# Patient Record
Sex: MALE | Race: BLACK OR AFRICAN AMERICAN | NOT HISPANIC OR LATINO | URBAN - METROPOLITAN AREA
[De-identification: names, ages, dates, MRNs, and addresses within clinical notes are randomized per-mention and may not be internally consistent; named-entity substitution may affect disease eponyms.]

---

## 2018-10-22 ENCOUNTER — OFFICE VISIT - HEALTHEAST (OUTPATIENT)
Dept: FAMILY MEDICINE | Facility: CLINIC | Age: 23
End: 2018-10-22

## 2018-10-22 DIAGNOSIS — F20.0 PARANOID SCHIZOPHRENIA, CHRONIC CONDITION (H): ICD-10-CM

## 2018-10-22 DIAGNOSIS — Z01.00 ENCOUNTER FOR EXAMINATION OF VISION: ICD-10-CM

## 2018-10-22 DIAGNOSIS — F31.63: ICD-10-CM

## 2018-10-22 DIAGNOSIS — F17.200 TOBACCO USE DISORDER: ICD-10-CM

## 2018-10-22 DIAGNOSIS — R45.851 SUICIDAL IDEATIONS: ICD-10-CM

## 2018-10-22 ASSESSMENT — MIFFLIN-ST. JEOR: SCORE: 2182.38

## 2018-11-15 ENCOUNTER — COMMUNICATION - HEALTHEAST (OUTPATIENT)
Dept: SCHEDULING | Facility: CLINIC | Age: 23
End: 2018-11-15

## 2018-12-04 ENCOUNTER — COMMUNICATION - HEALTHEAST (OUTPATIENT)
Dept: FAMILY MEDICINE | Facility: CLINIC | Age: 23
End: 2018-12-04

## 2018-12-04 DIAGNOSIS — F31.63: ICD-10-CM

## 2018-12-06 ENCOUNTER — COMMUNICATION - HEALTHEAST (OUTPATIENT)
Dept: ADMINISTRATIVE | Facility: CLINIC | Age: 23
End: 2018-12-06

## 2018-12-10 ENCOUNTER — COMMUNICATION - HEALTHEAST (OUTPATIENT)
Dept: FAMILY MEDICINE | Facility: CLINIC | Age: 23
End: 2018-12-10

## 2019-01-22 ENCOUNTER — OFFICE VISIT - HEALTHEAST (OUTPATIENT)
Dept: FAMILY MEDICINE | Facility: CLINIC | Age: 24
End: 2019-01-22

## 2019-01-22 ENCOUNTER — COMMUNICATION - HEALTHEAST (OUTPATIENT)
Dept: FAMILY MEDICINE | Facility: CLINIC | Age: 24
End: 2019-01-22

## 2019-01-22 ENCOUNTER — RECORDS - HEALTHEAST (OUTPATIENT)
Dept: GENERAL RADIOLOGY | Facility: CLINIC | Age: 24
End: 2019-01-22

## 2019-01-22 DIAGNOSIS — J03.90 TONSILLITIS: ICD-10-CM

## 2019-01-22 DIAGNOSIS — R05.9 COUGH: ICD-10-CM

## 2019-01-22 DIAGNOSIS — L03.221 CELLULITIS OF NECK: ICD-10-CM

## 2019-01-22 DIAGNOSIS — R07.0 THROAT PAIN: ICD-10-CM

## 2019-01-22 LAB
BASOPHILS # BLD AUTO: 0.1 THOU/UL (ref 0–0.2)
BASOPHILS NFR BLD AUTO: 1 % (ref 0–2)
DEPRECATED S PYO AG THROAT QL EIA: NORMAL
EOSINOPHIL # BLD AUTO: 0.2 THOU/UL (ref 0–0.4)
EOSINOPHIL NFR BLD AUTO: 1 % (ref 0–6)
ERYTHROCYTE [DISTWIDTH] IN BLOOD BY AUTOMATED COUNT: 12.9 % (ref 11–14.5)
HCT VFR BLD AUTO: 43.6 % (ref 40–54)
HGB BLD-MCNC: 14.6 G/DL (ref 14–18)
LYMPHOCYTES # BLD AUTO: 2.4 THOU/UL (ref 0.8–4.4)
LYMPHOCYTES NFR BLD AUTO: 20 % (ref 20–40)
MCH RBC QN AUTO: 29 PG (ref 27–34)
MCHC RBC AUTO-ENTMCNC: 33.4 G/DL (ref 32–36)
MCV RBC AUTO: 87 FL (ref 80–100)
MONOCYTES # BLD AUTO: 1.6 THOU/UL (ref 0–0.9)
MONOCYTES NFR BLD AUTO: 13 % (ref 2–10)
NEUTROPHILS # BLD AUTO: 7.9 THOU/UL (ref 2–7.7)
NEUTROPHILS NFR BLD AUTO: 65 % (ref 50–70)
PLATELET # BLD AUTO: 190 THOU/UL (ref 140–440)
PMV BLD AUTO: 7.8 FL (ref 7–10)
RBC # BLD AUTO: 5.02 MILL/UL (ref 4.4–6.2)
WBC: 12.2 THOU/UL (ref 4–11)

## 2019-01-23 LAB — GROUP A STREP BY PCR: NORMAL

## 2019-01-28 ENCOUNTER — COMMUNICATION - HEALTHEAST (OUTPATIENT)
Dept: FAMILY MEDICINE | Facility: CLINIC | Age: 24
End: 2019-01-28

## 2019-11-24 ENCOUNTER — HOSPITAL ENCOUNTER (EMERGENCY)
Facility: CLINIC | Age: 24
Discharge: HOME | End: 2019-11-24
Attending: FAMILY MEDICINE | Admitting: FAMILY MEDICINE
Payer: MEDICARE

## 2019-11-24 VITALS
TEMPERATURE: 96.1 F | BODY MASS INDEX: 42.95 KG/M2 | DIASTOLIC BLOOD PRESSURE: 73 MMHG | RESPIRATION RATE: 16 BRPM | SYSTOLIC BLOOD PRESSURE: 118 MMHG | HEART RATE: 94 BPM | OXYGEN SATURATION: 97 % | WEIGHT: 290 LBS | HEIGHT: 69 IN

## 2019-11-24 DIAGNOSIS — F25.0 SCHIZOAFFECTIVE DISORDER, BIPOLAR TYPE (H): ICD-10-CM

## 2019-11-24 LAB
ALCOHOL BREATH TEST: 0 (ref 0–0.01)
AMPHETAMINES UR QL SCN: NEGATIVE
BARBITURATES UR QL: NEGATIVE
BENZODIAZ UR QL: NEGATIVE
CANNABINOIDS UR QL SCN: NEGATIVE
COCAINE UR QL: NEGATIVE
ETHANOL UR QL SCN: NEGATIVE
OPIATES UR QL SCN: NEGATIVE

## 2019-11-24 PROCEDURE — 80307 DRUG TEST PRSMV CHEM ANLYZR: CPT | Performed by: FAMILY MEDICINE

## 2019-11-24 PROCEDURE — 80320 DRUG SCREEN QUANTALCOHOLS: CPT | Performed by: FAMILY MEDICINE

## 2019-11-24 PROCEDURE — 90791 PSYCH DIAGNOSTIC EVALUATION: CPT

## 2019-11-24 PROCEDURE — 99283 EMERGENCY DEPT VISIT LOW MDM: CPT | Mod: Z6 | Performed by: FAMILY MEDICINE

## 2019-11-24 PROCEDURE — 99285 EMERGENCY DEPT VISIT HI MDM: CPT | Mod: 25 | Performed by: FAMILY MEDICINE

## 2019-11-24 RX ORDER — DIVALPROEX SODIUM 125 MG/1
125 TABLET, DELAYED RELEASE ORAL 2 TIMES DAILY
COMMUNITY

## 2019-11-24 RX ORDER — NICOTINE 21 MG/24HR
1 PATCH, TRANSDERMAL 24 HOURS TRANSDERMAL ONCE
Status: DISCONTINUED | OUTPATIENT
Start: 2019-11-24 | End: 2019-11-24

## 2019-11-24 RX ORDER — OLANZAPINE 10 MG/1
10 TABLET ORAL DAILY
COMMUNITY

## 2019-11-24 ASSESSMENT — MIFFLIN-ST. JEOR: SCORE: 2277.87

## 2019-11-24 NOTE — ED AVS SNAPSHOT
[facility], Emergency Department  [address]  [facility] [address]  Phone:  [phone]  Fax:  [phone]                                    Stefano Lopez   MRN: [id number]    Department:  [facility], Emergency Department   Date of Visit:  11/24/2019           After Visit Summary Signature Page    I have received my discharge instructions, and my questions have been answered. I have discussed any challenges I see with this plan with the nurse or doctor.    ..........................................................................................................................................  Patient/Patient Representative Signature      ..........................................................................................................................................  Patient Representative Print Name and Relationship to Patient    ..................................................               ................................................  Date                                   Time    ..........................................................................................................................................  Reviewed by Signature/Title    ...................................................              ..............................................  Date                                               Time          22EPIC Rev 08/18

## 2019-11-25 NOTE — ED PROVIDER NOTES
"  History     Chief Complaint   Patient presents with     Suicidal     Third party called Police saying patient walked up to them saying he was going to kill himself. Plan of using gun. Police have seen patient before, Hx of physical aggression with Police and EMS.      HPI  Stefano Lopez is a 24 year old male who has a history of schizoaffective disorder bipolar type, TBI with cognitive impairment, resident of a group home.  Recently group home has been giving him more freedom to do his own activities.  Today he went to his mother's house, walking there.  Was unable to get in and so he got angry.  He went to a nearby house and told the residents there that he was suicidal with a plan to shoot himself and asked them to call the police.  He now states he was just angry.  He denies that he is suicidal at this time.  He admits he has no access to firearms anyway.  He states he would not want to kill himself because it would hurt.  He denies any physical complaints.  He denies any substance abuse.    I have reviewed the Medications, Allergies, Past Medical and Surgical History, and Social History in the Epic system.    Review of Systems  All other systems were reviewed and are negative    Physical Exam   BP: (!) 153/88  Heart Rate: 108  Temp: 98.5  F (36.9  C)  Resp: 16  Height: 174 cm (5' 8.5\")  Weight: 131.5 kg (290 lb)  SpO2: 97 %      Physical Exam  Constitutional:       General: He is not in acute distress.     Appearance: He is not diaphoretic.   HENT:      Head: Atraumatic.   Eyes:      General: No scleral icterus.     Pupils: Pupils are equal, round, and reactive to light.   Cardiovascular:      Heart sounds: Normal heart sounds.   Pulmonary:      Effort: No respiratory distress.      Breath sounds: Normal breath sounds.   Abdominal:      General: Bowel sounds are normal.      Palpations: Abdomen is soft.      Tenderness: There is no abdominal tenderness.   Musculoskeletal:         General: No tenderness. "   Skin:     General: Skin is warm.      Findings: No rash.   Psychiatric:         Attention and Perception: Attention normal.         Mood and Affect: Mood normal.         Speech: Speech normal.         Behavior: Behavior normal.         Thought Content: Thought content is not paranoid. Thought content does not include homicidal or suicidal ideation.         ED Course        Procedures             Critical Care time:  none             Labs Ordered and Resulted from Time of ED Arrival Up to the Time of Departure from the ED   ALCOHOL BREATH TEST POCT - Normal   DRUG ABUSE SCREEN 6 CHEM DEP URINE ([facility])            Assessments & Plan (with Medical Decision Making)   24-year-old male, resident of a group home with chronic mental illness.  Today he got angry when he could not get into his mother's house and told a bystander that he was feeling suicidal.  The patient was also seen by the [facility] , please refer to their extensive note/evaluation which was reviewed with me and is documented in EPIC on 11/24/2019 for further details.  The patient is now pleasant, cooperative and does not appear depressed.  He states he was angry before but no longer feels suicidal.  He does not have any active psychiatric symptoms while in the ED.  No medical complaints.  Patient does not appear to meet criteria for inpatient hospitalization, and is unlikely to benefit from hospitalization at this time based on the clinical presentation.  We spoke with the group home who will accept him back in transfer.  This appears to be an exacerbation of his chronic mental illness and he does not appear to represent an imminent risk of harm to self or others.  We discussed the indications for emergency department return and follow-up.  Stable for discharge.    I have reviewed the nursing notes.    I have reviewed the findings, diagnosis, plan and need for follow up with the patient.    New Prescriptions    No medications on file       Final  diagnoses:   Schizoaffective disorder, bipolar type (H)       11/24/2019   [facility], [place], EMERGENCY DEPARTMENT     Hector Espinosa MD  11/24/19 2697

## 2019-11-25 NOTE — ED NOTES
Patient reports SI with plan of gun to the head. Patient does not have access to a gun. Patient reports previous attempt several years ago after using cocaine for the first time, was going to jump off a bridge, but thought about how his bones would be broken, did not want to suffer and thought of Mother and Father. Stressors include lives in group house where he feels really isolated, no one wants to talk to him. Patient went to his Mother's house today. His Mother would not let him in so patient went to a neighbor across the street and told them he was SI. Patient reports address as [address] [place] [address] in [place]. Hx of TBI age 14 when boxing. Takes Depakote twice daily, first dose was taken today. Takes Zyprexa in the evenings, did not take yet. Is able to contract for safety. Patient has an Hx of aggression to Police and EMS, considering this Hx assessment to be done in main ED. Patient is calm and cooperative at this time.

## 2019-11-25 NOTE — ED NOTES
Bed: ED16A  Expected date:   Expected time:   Means of arrival:   Comments:  , 22M, psych, cooperative, yellow in 10.

## 2019-11-25 NOTE — DISCHARGE INSTRUCTIONS
Thank you for choosing [facility].     Please closely monitor for further symptoms. Return to the Emergency Department if you develop any new or worsening signs or symptoms.    If you received any opiate pain medications or sedatives during your visit, please do not drive for at least 8 hours.     Labs, cultures or final xray interpretations may still need to be reviewed.  We will call you if your plan of care needs to be changed.    Please follow up with your primary care physician or clinic.

## 2019-11-25 NOTE — ED TRIAGE NOTES
Third party called Police saying patient walked up to them saying he was going to kill himself. Plan of using gun. Police have seen patient before, Hx of physical aggression with Police and EMS.

## 2021-06-02 VITALS — WEIGHT: 284.4 LBS | HEIGHT: 64 IN | BODY MASS INDEX: 48.55 KG/M2

## 2021-06-02 VITALS — WEIGHT: 306 LBS | BODY MASS INDEX: 51.87 KG/M2

## 2021-06-16 PROBLEM — F29 PSYCHOSIS (H): Status: ACTIVE | Noted: 2017-07-28

## 2021-06-16 PROBLEM — R45.851 SUICIDAL IDEATIONS: Status: ACTIVE | Noted: 2017-07-28

## 2021-06-16 PROBLEM — F12.10 CANNABIS ABUSE: Chronic | Status: ACTIVE | Noted: 2019-02-12

## 2021-06-16 PROBLEM — F31.63: Status: ACTIVE | Noted: 2017-07-29

## 2021-06-16 PROBLEM — F25.0 SCHIZOAFFECTIVE DISORDER, BIPOLAR TYPE (H): Chronic | Status: ACTIVE | Noted: 2019-02-12

## 2021-06-17 NOTE — PATIENT INSTRUCTIONS - HE
Patient Instructions by Tita Fuentes CNP at 1/22/2019 12:30 PM     Author: Tita Fuentes CNP Service: -- Author Type: Nurse Practitioner    Filed: 1/22/2019  1:59 PM Encounter Date: 1/22/2019 Status: Addendum    : Tita Fuentes CNP (Nurse Practitioner)    Related Notes: Original Note by Tita Fuentes CNP (Nurse Practitioner) filed at 1/22/2019  1:59 PM         Patient Education     Discharge Instructions for Cellulitis  You have been diagnosed with cellulitis. This is an infection in the deepest layer of the skin. In some cases, the infection also affects the muscle. Cellulitis is caused by bacteria. The bacteria can enter the body through broken skin. This can happen with a cut, scratch, animal bite, or an insect bite that has been scratched. You may have been treated in the hospital with antibiotics and fluids. You will likely be given a prescription for antibiotics to take at home. This sheet will help you take care of yourself at home.  Home care  When you are home:    Take the prescribed antibiotic medicine you are given as directed until it is gone. Take it even if you feel better. It treats the infection and stops it from returning. Not taking all the medicine can make future infections hard to treat.    Keep the infected area clean.    When possible, raise the infected area above the level of your heart. This helps keep swelling down.    Talk with your healthcare provider if you are in pain. Ask what kind of over-the-counter medicine you can take for pain.    Apply clean bandages as advised.    Take your temperature once a day for a week.    Wash your hands often to prevent spreading the infection.  In the future, wash your hands before and after you touch cuts, scratches, or bandages. This will help prevent infection.   When to call your healthcare provider  Call your healthcare provider immediately if you have any of the following:    Difficulty or pain when  moving the joints above or below the infected area    Discharge or pus draining from the area    Fever of 100.4 F (38 C) or higher, or as directed by your healthcare provider    Pain that gets worse in or around the infected     Redness that gets worse in or around the infected area, particularly if the area of redness expands to a wider area    Shaking chills    Swelling of the infected area    Vomiting   Date Last Reviewed: 8/1/2016 2000-2017 [employer]. [address] [place] [address]. All rights reserved. This information is not intended as a substitute for professional medical care. Always follow your healthcare professional's instructions.           Patient Education     Cellulitis  Cellulitis is an infection of the deep layers of skin. A break in the skin, such as a cut or scratch, can let bacteria under the skin. If the bacteria get to deep layers of the skin, it can be serious. If not treated, cellulitis can get into the bloodstream and lymph nodes. The infection can then spread throughout the body. This causes serious illness.  Cellulitis causes the affected skin to become red, swollen, warm, and sore. The reddened areas have a visible border. An open sore may leak fluid (pus). You may have a fever, chills, and pain.  Cellulitis is treated with antibiotics taken for 7 to 10 days. An open sore may be cleaned and covered with cool wet gauze. Symptoms should get better 1 to 2 days after treatment is started. Make sure to take all the antibiotics for the full number of days until they are gone. Keep taking the medicine even if your symptoms go away.  Home care  Follow these tips:    Limit the use of the part of your body with cellulitis.     If the infection is on your leg, keep your leg raised while sitting. This will help to reduce swelling.    Take all of the antibiotic medicine exactly as directed until it is gone. Do not miss any doses, especially during the first 7 days. Dont  stop taking the medicine when your symptoms get better.    Keep the affected area clean and dry.    Wash your hands with soap and warm water before and after touching your skin. Anyone else who touches your skin should also wash his or her hands. Don't share towels.  Follow-up care  Follow up with your healthcare provider, or as advised. If your infection does not go away on the first antibiotic, your healthcare provider will prescribe a different one.  When to seek medical advice  Call your healthcare provider right away if any of these occur:    Red areas that spread    Swelling or pain that gets worse    Fluid leaking from the skin (pus)    Fever higher of 100.4  F (38.0  C) or higher after 2 days on antibiotics  Date Last Reviewed: 9/1/2016 2000-2017 [employer]. [address] [place] [address], [place] [address]. All rights reserved. This information is not intended as a substitute for professional medical care. Always follow your healthcare professional's instructions.           Patient Education     Viral Upper Respiratory Illness (Adult)  You have a viral upper respiratory illness (URI), which is another term for the common cold. This illness is contagious during the first few days. It is spread through the air by coughing and sneezing. It may also be spread by direct contact (touching the sick person and then touching your own eyes, nose, or mouth). Frequent handwashing will decrease risk of spread. Most viral illnesses go away within 7 to 10 days with rest and simple home remedies. Sometimes the illness may last for several weeks. Antibiotics will not kill a virus, and they are generally not prescribed for this condition.    Home care    If symptoms are severe, rest at home for the first 2 to 3 days. When you resume activity, don't let yourself get too tired.    Avoid being exposed to cigarette smoke (yours or others).    You may use acetaminophen or ibuprofen to control pain and fever, unless  another medicine was prescribed. If you have chronic liver or kidney disease, have ever had a stomach ulcer or gastrointestinal bleeding, or are taking blood-thinning medicines, talk with your healthcare provider before using these medicines. Aspirin should never be given to anyone under 18 years of age who is ill with a viral infection or fever. It may cause severe liver or brain damage.    Your appetite may be poor, so a light diet is fine. Avoid dehydration by drinking 6 to 8 glasses of fluids per day (water, soft drinks, juices, tea, or soup). Extra fluids will help loosen secretions in the nose and lungs.    Over-the-counter cold medicines will not shorten the length of time youre sick, but they may be helpful for the following symptoms: cough, sore throat, and nasal and sinus congestion. (Note: Do not use decongestants if you have high blood pressure.)  Follow-up care  Follow up with your healthcare provider, or as advised.  When to seek medical advice  Call your healthcare provider right away if any of these occur:    Cough with lots of colored sputum (mucus)    Severe headache; face, neck, or ear pain    Difficulty swallowing due to throat pain    Fever of 100.4 F (38 C) or higher, or as directed by your healthcare provider  Call 911  Call 911 if any of these occur:    Chest pain, shortness of breath, wheezing, or difficulty breathing    Coughing up blood    Inability to swallow due to throat pain  Date Last Reviewed: 9/13/2015 2000-2017 [employer]. [address] [place] [address], [place] [address]. All rights reserved. This information is not intended as a substitute for professional medical care. Always follow your healthcare professional's instructions.

## 2021-06-21 NOTE — PROGRESS NOTES
ASSESSMENT:  1. Severe mixed bipolar 1 disorder without psychosis (H)    2. Suicidal ideations    3. Paranoid schizophrenia, chronic condition (H)  - paliperidone palmitate (INVEGA SUSTENNA) 39 mg/0.25 mL Syrg IM injection; Inject 1.5 mL (234 mg total) into the shoulder, thigh, or buttocks every 30 (thirty) days.  Dispense: 1.5 mL; Refill: 0  - divalproex (DEPAKOTE ER) 500 MG 24 hour tablet; Take 2 tablets (1,000 mg total) by mouth 2 (two) times a day.  Dispense: 120 tablet; Refill: 3    4. Tobacco use disorder    5. Encounter for examination of vision  - Ambulatory referral to Ophthalmology      PLAN:  I did try to look through his medication and his history from care everywhere.  We discussed his various issues at this point we will continue with his medications.  Counseled on tobacco use as well as on making sure to be physically active and exercising.  This will happen to lose weight and continue to be physically healthy.  We will follow-up with him as needed    Orders Placed This Encounter   Procedures     Ambulatory referral to Ophthalmology     Referral Priority:   Routine     Referral Type:   Consultation     Referral Reason:   Evaluation and Treatment     Referral Location:   [facility]     Requested Specialty:   Ophthalmology     Number of Visits Requested:   1     Medications Discontinued During This Encounter   Medication Reason     OLANZapine (ZYPREXA) 20 MG tablet      paliperidone palmitate (INVEGA SUSTENNA) 39 mg/0.25 mL Syrg IM injection Reorder     divalproex (DEPAKOTE ER) 250 MG 24 hour tablet Reorder       No Follow-up on file.      CHIEF COMPLAINT:  Chief Complaint   Patient presents with     Establish Care       HISTORY OF PRESENT ILLNESS:  Stefano is a 23 y.o. male presenting to the clinic today to establish care.Lives in a group home.Has Schizophrenia Paranoid as well Bipolar disorder.Has been leaving on his own for sometime now but noted not to be taking care of himself and will end  up in the ER.Last admission was to [facility].Currently see Psychiatrist at the [facility].Noted having an increase in his medications according to the personnel that came in with him from the group home.  He noted not really having any major concerns or symptoms at this point.  He just wants to establish care and go through his medications.  He also needs to have a referral for site as well as see a dentist which unfortunately I cannot refer them to.  We did do a hearing screen here which was normal.    REVIEW OF SYSTEMS:   As noted he does have a history of bipolar, has schizoaffective disorder.  Notes that he wants to continue to box at this point, but he cannot do that while smoking marijuana.  He was also noted to leave the group home in the middle of the night and attempt to going to [place] according to him to see his friends.  He is also noted to have randomly call the EMS and 911.  We did discuss this and explained to him why he cannot do those at this point.  I did review his medications.  All other systems are negative.    PFSH:  Reviewed, as below.    History   Smoking Status     Current Every Day Smoker   Smokeless Tobacco     Never Used       No family history on file.    Social History     Social History     Marital status: Single     Spouse name: N/A     Number of children: N/A     Years of education: N/A     Occupational History     Not on file.     Social History Main Topics     Smoking status: Current Every Day Smoker     Smokeless tobacco: Never Used     Alcohol use No     Drug use: No     Sexual activity: Not on file     Other Topics Concern     Not on file     Social History Narrative       No past surgical history on file.    No Known Allergies    Active Ambulatory Problems     Diagnosis Date Noted     Suicidal ideations 07/28/2017     Psychosis (H) 07/28/2017     Severe mixed bipolar 1 disorder without psychosis (H) 07/29/2017     Resolved Ambulatory Problems      "Diagnosis Date Noted     No Resolved Ambulatory Problems     Past Medical History:   Diagnosis Date     Bipolar affective (H)      Obesity        Current Outpatient Prescriptions   Medication Sig Dispense Refill     carbamide peroxide (DEBROX) 6.5 % otic solution Administer into ears.       divalproex (DEPAKOTE ER) 500 MG 24 hour tablet Take 2 tablets (1,000 mg total) by mouth 2 (two) times a day. 120 tablet 3     fluticasone (FLONASE) 50 mcg/actuation nasal spray 1 spray into each nostril.       hydrOXYzine HCl (ATARAX) 25 MG tablet Take 25 mg by mouth.       loratadine (CLARITIN) 10 mg tablet Take 10 mg by mouth.       neomycin-polymyxin-hydrocortisone (CORTISPORIN) otic solution Administer into ears.       omeprazole (PRILOSEC) 20 MG capsule Take 20 mg by mouth.       paliperidone palmitate (INVEGA SUSTENNA) 39 mg/0.25 mL Syrg IM injection Inject 1.5 mL (234 mg total) into the shoulder, thigh, or buttocks every 30 (thirty) days. 1.5 mL 0     sodium chloride 0.65 % Drop Use in nose as needed.       sucralfate (CARAFATE) 100 mg/mL suspension Take 1 g by mouth.       acetaminophen (TYLENOL) 325 MG tablet Take 325 mg by mouth every 4 (four) hours as needed.       acetaminophen (TYLENOL) 500 MG tablet Take 500-1,000 mg by mouth.       calcium, as carbonate, (TUMS) 200 mg calcium (500 mg) chewable tablet Chew 1 tablet.       diphenhydrAMINE (BENADRYL) 25 mg capsule Take 25 mg by mouth.       docusate sodium (COLACE) 100 MG capsule Take 100 mg by mouth.       ibuprofen (ADVIL,MOTRIN) 200 MG tablet Take 200 mg by mouth every 4 (four) hours as needed.       OLANZapine zydis (ZYPREXA) 10 MG disintegrating tablet Take 10 mg by mouth.       No current facility-administered medications for this visit.        VITALS:  Vitals:    10/22/18 1433   BP: 118/84   Pulse: 80   Resp: 18   Weight: (!) 284 lb 6.4 oz (129 kg)   Height: 5' 4.4\" (1.636 m)     Wt Readings from Last 3 Encounters:   10/22/18 (!) 284 lb 6.4 oz (129 kg) "   07/29/17 209 lb 9.6 oz (95.1 kg)     Body mass index is 48.21 kg/(m^2).    PHYSICAL EXAM:  General Appearance: Alert, cooperative, no distress, appears stated age  HEENT: Pupils are equal and reactive, extraocular motions is normal.  Oropharynx is moist.  Neck is supple no notable thyromegaly.  External ears are normal.  Lungs: Clear to auscultation bilaterally, respirations unlabored  Heart: Regular rate and rhythm, S1 and S2 normal, no murmur, rub, or gallop  Abdomen: Soft but slightly distended.  Skin:Normal  Musculoskeletal: Normal range of motion. No joint swelling or deformity.   Neurologic:  Alert and oriented times 3. Normal reflexes. Cranial nerves II-XII intact.   Psychiatric: Normal mood and affect. Goes back to previously discussed topic.Appears not to have any insight into his problem.    MEDICATIONS:  Current Outpatient Prescriptions   Medication Sig Dispense Refill     carbamide peroxide (DEBROX) 6.5 % otic solution Administer into ears.       divalproex (DEPAKOTE ER) 500 MG 24 hour tablet Take 2 tablets (1,000 mg total) by mouth 2 (two) times a day. 120 tablet 3     fluticasone (FLONASE) 50 mcg/actuation nasal spray 1 spray into each nostril.       hydrOXYzine HCl (ATARAX) 25 MG tablet Take 25 mg by mouth.       loratadine (CLARITIN) 10 mg tablet Take 10 mg by mouth.       neomycin-polymyxin-hydrocortisone (CORTISPORIN) otic solution Administer into ears.       omeprazole (PRILOSEC) 20 MG capsule Take 20 mg by mouth.       paliperidone palmitate (INVEGA SUSTENNA) 39 mg/0.25 mL Syrg IM injection Inject 1.5 mL (234 mg total) into the shoulder, thigh, or buttocks every 30 (thirty) days. 1.5 mL 0     sodium chloride 0.65 % Drop Use in nose as needed.       sucralfate (CARAFATE) 100 mg/mL suspension Take 1 g by mouth.       acetaminophen (TYLENOL) 325 MG tablet Take 325 mg by mouth every 4 (four) hours as needed.       acetaminophen (TYLENOL) 500 MG tablet Take 500-1,000 mg by mouth.       calcium, as  carbonate, (TUMS) 200 mg calcium (500 mg) chewable tablet Chew 1 tablet.       diphenhydrAMINE (BENADRYL) 25 mg capsule Take 25 mg by mouth.       docusate sodium (COLACE) 100 MG capsule Take 100 mg by mouth.       ibuprofen (ADVIL,MOTRIN) 200 MG tablet Take 200 mg by mouth every 4 (four) hours as needed.       OLANZapine zydis (ZYPREXA) 10 MG disintegrating tablet Take 10 mg by mouth.       No current facility-administered medications for this visit.

## 2021-06-23 NOTE — TELEPHONE ENCOUNTER
Medication Question or Clarification  Who is calling: Other: Tamika - Manager to group home  What medication are you calling about?: benzonatate (TESSALON) 200 MG capsule  What dose do you take?: 200 mg  How often are you taking the medication?: PRN  Who prescribed the medication?: Tita Fuentes CNP  What is your question/concern?: Medication is not covered by insurance.  Please send alternative.   was not aware of any alternatives covered.  Pharmacy: [id number]  Okay to leave a detailed message?: No  Site CMT - Please call the pharmacy to obtain any additional needed information.

## 2021-06-23 NOTE — TELEPHONE ENCOUNTER
Tamika  of group home where patient resides given information and instructions as per Tita Fuentes CNP. Tamika verbalized understanding and thanked for the call.

## 2021-06-23 NOTE — TELEPHONE ENCOUNTER
Medication Question or Clarification  Who is calling: Other: Tamika, health manager at [facility] (no consent is on file and the current one is invalid)  What medication are you calling about?: cephalexin  What dose do you take?: caller could not confirm this  How often are you taking the medication?: caller could not confirm this  Who prescribed the medication?: Heather Fuentes CNP  What is your question/concern?: Caller stated patient was seen by [facility] and was given 2 doses on 1/23 and 1/24. Caller stated patient went to his mother's house and hasn't taken his antibiotic since 1/24. Caller stated patient just came home today and she wanted to know if patient should still continue his antibiotic.  Pharmacy: n/a  Okay to leave a detailed message?: Yes. Please ask for nurse Gabriela.  [phone]  Site CMT - Please call the pharmacy to obtain any additional needed information.

## 2021-06-23 NOTE — PROGRESS NOTES
Assessment:     1. Throat pain  Rapid Strep A Screen-Throat swab    Group A Strep, RNA Direct Detection, Throat    dexamethasone injection 10 mg (DECADRON)   2. Cough  XR Chest 2 Views    albuterol (PROAIR HFA;PROVENTIL HFA;VENTOLIN HFA) 90 mcg/actuation inhaler    benzonatate (TESSALON) 200 MG capsule   3. Cellulitis of neck  HM1(CBC and Differential)    HM1 (CBC with Diff)    cephalexin (KEFLEX) 500 MG capsule   4. Tonsillitis  dexamethasone injection 10 mg (DECADRON)     Results for orders placed or performed in visit on 01/22/19   Rapid Strep A Screen-Throat swab   Result Value Ref Range    Rapid Strep A Antigen No Group A Strep detected, presumptive negative No Group A Strep detected, presumptive negative   HM1 (CBC with Diff)   Result Value Ref Range    WBC 12.2 (H) 4.0 - 11.0 thou/uL    RBC 5.02 4.40 - 6.20 mill/uL    Hemoglobin 14.6 14.0 - 18.0 g/dL    Hematocrit 43.6 40.0 - 54.0 %    MCV 87 80 - 100 fL    MCH 29.0 27.0 - 34.0 pg    MCHC 33.4 32.0 - 36.0 g/dL    RDW 12.9 11.0 - 14.5 %    Platelets 190 140 - 440 thou/uL    MPV 7.8 7.0 - 10.0 fL    Neutrophils % 65 50 - 70 %    Lymphocytes % 20 20 - 40 %    Monocytes % 13 (H) 2 - 10 %    Eosinophils % 1 0 - 6 %    Basophils % 1 0 - 2 %    Neutrophils Absolute 7.9 (H) 2.0 - 7.7 thou/uL    Lymphocytes Absolute 2.4 0.8 - 4.4 thou/uL    Monocytes Absolute 1.6 (H) 0.0 - 0.9 thou/uL    Eosinophils Absolute 0.2 0.0 - 0.4 thou/uL    Basophils Absolute 0.1 0.0 - 0.2 thou/uL     Xr Chest 2 Views    Result Date: 1/22/2019  XR CHEST 2 VIEWS 1/22/2019 1:28 PM INDICATION: Cough COMPARISON: None. FINDINGS: Lungs are clear. Heart and pulmonary vascularity are normal. No signs of active disease.     Plan:     Differential diagnosis include but not limited to strep pharyngitis, pneumonia, tonsillitis, anterior cervical lymphadenopathy, or cellulitis.  On exam finding patient with enlarged tonsils and oropharyngeal erythema.  Rapid strep done, negative.  For the enlarged tonsils  and erythema patient was given Decadron.  Also patient was found to have a cough and abnormal lung sounds during the exam.  Chest x-ray done to rule out pneumonia, negative.  I personally reviewed the chest x-ray.  CBC done to rule out infection, patient with slight elevation of WBC, 12.2.  Will treat patient for cellulitis but the anterior neck on the right side with Keflex 4 times daily times 10 days.  Advised patient to increase fluid intake.  Will also treat patient for the cough with Tessalon Perles 3 times daily as needed times 10 days and albuterol inhaler every 6 hours as needed for shortness of breath.  Advised patient and the caregiver to monitor for worsening symptoms.  May follow-up with PCP if symptoms does not resolve.  Patient and the caregiver verbalized understanding the plan of care.    Subjective:       23 y.o. male presents for evaluation of a sore throat, and some swelling on the right side of his neck.  Patient reports that he has been experiencing symptoms for about 5 days.  He previously was seen in the emergency room where he was diagnosed with a viral infection and was also given a nasal spray that he has been using.  He reports that he continues to cough, admits to subjective fevers.  He has been off vomiting also last vomit was yesterday.  He admits to shortness of breath but denies chest discomfort.  He admits that his sore throat is difficult to swallow, and he also has been having trouble breathing for the past 5 days.  He has been taking Tylenol and cough syrup which are not effective for his symptoms.  Denies being exposed to someone with similar symptoms.  Patient has history of asthma, but this does not feel the same way when he has an asthma exacerbation.      The following portions of the patient's history were reviewed and updated as appropriate: allergies, current medications, past family history, past medical history, past social history, past surgical history and problem  list.    Review of Systems  A 12 point comprehensive review of systems was negative except as noted.      Objective:      /75 (Patient Site: Right Arm, Patient Position: Sitting, Cuff Size: Adult Large)   Pulse (!) 106   Temp 98.6  F (37  C) (Oral)   Resp 16   Wt (!) 306 lb (138.8 kg)   SpO2 96%   BMI 51.87 kg/m    General appearance: alert, appears stated age, cooperative and moderate distress  Head: Normocephalic, without obvious abnormality, atraumatic, sinuses nontender to percussion  Eyes: conjunctivae/corneas clear. PERRL, EOM's intact. Fundi benign.  Ears: abnormal TM right ear - bulging and air-fluid level and abnormal TM left ear - bulging and air-fluid level  Nose: Nares normal. Septum midline. Mucosa normal. No drainage or sinus tenderness., clear discharge, turbinates swollen, grade 4 out of 4, no sinus tenderness  Throat: abnormal findings: moderate oropharyngeal erythema  Neck: no adenopathy, no carotid bruit, no JVD, supple, symmetrical, trachea midline and thyroid not enlarged, symmetric, no tenderness/mass/nodules  Lungs: rhonchi bilaterally  Heart: regular rate and rhythm, S1, S2 normal, no murmur, click, rub or gallop  Extremities: extremities normal, atraumatic, no cyanosis or edema  Pulses: 2+ and symmetric  Skin: Skin color, texture, turgor normal. No rashes or lesions or inflammation and erythema to the right anterior neck, warm to touch  Lymph nodes: Cervical adenopathy: right anterior hypertrophied  Neurologic: Grossly normal     This note has been dictated using voice recognition software. Any grammatical or context distortions are unintentional and inherent to the software

## 2021-06-23 NOTE — TELEPHONE ENCOUNTER
Spoke to home health aide regarding question. Recommended that the patient be evaluated again to see if cellulitis or tonsillitis is still present since the patient is not very reliable.
